# Patient Record
Sex: MALE | NOT HISPANIC OR LATINO | Employment: FULL TIME | RURAL
[De-identification: names, ages, dates, MRNs, and addresses within clinical notes are randomized per-mention and may not be internally consistent; named-entity substitution may affect disease eponyms.]

---

## 2024-04-25 ENCOUNTER — OFFICE VISIT (OUTPATIENT)
Dept: FAMILY MEDICINE CLINIC | Facility: CLINIC | Age: 39
End: 2024-04-25
Payer: COMMERCIAL

## 2024-04-25 VITALS
HEART RATE: 103 BPM | DIASTOLIC BLOOD PRESSURE: 70 MMHG | SYSTOLIC BLOOD PRESSURE: 110 MMHG | TEMPERATURE: 98.6 F | BODY MASS INDEX: 23.44 KG/M2 | OXYGEN SATURATION: 98 % | HEIGHT: 71 IN | WEIGHT: 167.4 LBS

## 2024-04-25 DIAGNOSIS — R19.7 DIARRHEA, UNSPECIFIED TYPE: ICD-10-CM

## 2024-04-25 DIAGNOSIS — R53.83 FATIGUE, UNSPECIFIED TYPE: ICD-10-CM

## 2024-04-25 DIAGNOSIS — R42 DIZZINESS: ICD-10-CM

## 2024-04-25 DIAGNOSIS — R16.1 SPLENOMEGALY: ICD-10-CM

## 2024-04-25 DIAGNOSIS — R63.4 WEIGHT LOSS, NON-INTENTIONAL: ICD-10-CM

## 2024-04-25 DIAGNOSIS — R11.14 BILIOUS VOMITING WITH NAUSEA: Primary | ICD-10-CM

## 2024-04-25 NOTE — LETTER
April 25, 2024     Patient: Bubba Cooper   YOB: 1985   Date of Visit: 4/25/2024       To Whom it May Concern:    Bubba Cooper was seen in my clinic on 4/25/2024. He may return to work in three days (Monday, April 29, 2024).    Please consider this as advance notice that Silvio may require an extended time off from work, depending on the results of testing that we have initiated today. He has had three weeks of nausea, vomiting, and diarrhea which has lead to weight loss and debilitating fatigue and dizziness. He has been seen in the ER twice within this time frame. I have ordered further tests to determine the cause of his symptoms, and it is my professional opinion that he not return to work prior to next Monday.     Please feel free to contact our office if you have any further questions or need for any formal paperwork to be completed.        Sincerely,          SUMAYA Becker

## 2024-04-26 ENCOUNTER — TELEPHONE (OUTPATIENT)
Dept: FAMILY MEDICINE CLINIC | Facility: CLINIC | Age: 39
End: 2024-04-26
Payer: COMMERCIAL

## 2024-04-26 NOTE — TELEPHONE ENCOUNTER
Spoke to patient regarding his lab results from yesterday. Preliminary results were printed from [employer]'s website and will be scanned into patient's chart.     We discussed hydration status. Patient reports he has continued with diarrhea but has had no more vomiting since his appointment yesterday. He has eaten and drank today.     Encouraged to push fluids and rest. Will touch base next week after the rest of his lab values result. Discussed reasons to seek ER care over the weekend versus return to the office on Monday. All questions answered.

## 2024-04-28 PROBLEM — R19.7 DIARRHEA: Status: ACTIVE | Noted: 2024-04-28

## 2024-04-28 PROBLEM — R11.14 BILIOUS VOMITING WITH NAUSEA: Status: ACTIVE | Noted: 2024-04-28

## 2024-04-28 PROBLEM — R53.83 FATIGUE: Status: ACTIVE | Noted: 2024-04-28

## 2024-04-28 PROBLEM — R16.1 SPLENOMEGALY: Status: ACTIVE | Noted: 2024-04-28

## 2024-04-28 PROBLEM — R63.4 WEIGHT LOSS, NON-INTENTIONAL: Status: ACTIVE | Noted: 2024-04-28

## 2024-04-28 PROBLEM — R42 DIZZINESS: Status: ACTIVE | Noted: 2024-04-28

## 2024-04-28 NOTE — ASSESSMENT & PLAN NOTE
Patient has been seen at the ER twice within the past 3 weeks. ER completed a CT scan at one point which showed splenomegaly. Patient does not participate in any contact sports at this time. Tip of spleen is palpable on exam today. Strongly encouraged rest and avoidance of any trauma to the abdomen - explained risks and reasons to seek ER care if abdominal trauma were to occur.     Will check labs.

## 2024-04-28 NOTE — PROGRESS NOTES
"    Office Note     Name: Bubba Cooper    : 1985     MRN: [id number]     Chief Complaint  Diarrhea (Pt has had diarrhea for 3 weeks now. States its when he drinks and when he eats), Insomnia (Pt has had a problem getting asleep due to diarrhea and vomiting every couple hours.), Weight Loss (Pt has lost about 20 pounds due to not being able to keep anything down ), Vomiting (Pt has been throwing up for about 3 weeks now ), and Dizziness (Pt states he tends to get very dizzy after walking for a while )    Subjective     History of Present Illness:  Bubba Cooper is a 38 y.o. male who presents today for vomiting and diarrhea for the past 3 weeks. He has lost 20 pounds. He is extremely fatigued and gets dizzy when walking around. He's been seen in there ER twice within this time frame.       Objective     History reviewed. No pertinent past medical history.  History reviewed. No pertinent surgical history.  History reviewed. No pertinent family history.    Vital Signs  /70 (BP Location: Left arm, Patient Position: Sitting, Cuff Size: Adult)   Pulse 103   Temp 98.6 °F (37 °C) (Oral)   Ht 180.3 cm (71\")   Wt 75.9 kg (167 lb 6.4 oz)   SpO2 98%   BMI 23.35 kg/m²   Estimated body mass index is 23.35 kg/m² as calculated from the following:    Height as of this encounter: 180.3 cm (71\").    Weight as of this encounter: 75.9 kg (167 lb 6.4 oz).    Physical Exam  Vitals reviewed.   Constitutional:       Appearance: Normal appearance.   HENT:      Head: Normocephalic.      Right Ear: Tympanic membrane, ear canal and external ear normal.      Left Ear: Tympanic membrane, ear canal and external ear normal.      Nose: Nose normal.      Mouth/Throat:      Mouth: Mucous membranes are moist.      Pharynx: Posterior oropharyngeal erythema (mild) present.   Cardiovascular:      Rate and Rhythm: Normal rate and regular rhythm.      Heart sounds: Normal heart sounds.   Pulmonary:      Effort: Pulmonary effort is normal. "      Breath sounds: Normal breath sounds.   Abdominal:      General: Abdomen is flat. Bowel sounds are normal.      Palpations: Abdomen is soft. There is splenomegaly (tip of spleen felt on palpation of LUQ).      Tenderness: There is abdominal tenderness in the right upper quadrant, epigastric area and left upper quadrant.   Skin:     General: Skin is warm and dry.      Capillary Refill: Capillary refill takes less than 2 seconds.   Neurological:      General: No focal deficit present.      Mental Status: He is alert and oriented to person, place, and time.   Psychiatric:         Mood and Affect: Mood normal.         Behavior: Behavior normal.                   POCT Results (if applicable):  No results found for this or any previous visit.         Assessment and Plan     Diagnoses and all orders for this visit:    1. Bilious vomiting with nausea (Primary)  Assessment & Plan:  Patient reports he has had nausea, vomiting, and diarrhea for the past 3 weeks. He has been seen at the ER twice within that time. He reports he has been unable to keep anything down; either vomiting or diarrhea every couple of hours.     Will check labs. Discussed and encouraged symptomatic measures. Discussed when to seek ER care versus return to the office for another evaluation.     Orders:  -     CBC & Differential  -     Comprehensive Metabolic Panel  -     Amylase  -     Lipase    2. Diarrhea, unspecified type  Assessment & Plan:  Patient reports he has had nausea, vomiting, and diarrhea for the past 3 weeks. He has been seen at the ER twice within that time. He reports he has been unable to keep anything down; either vomiting or diarrhea every couple of hours.     Will check labs. Discussed and encouraged symptomatic measures. Discussed when to seek ER care versus return to the office for another evaluation.     Orders:  -     CBC & Differential  -     Comprehensive Metabolic Panel  -     Amylase  -     Lipase    3. Weight loss,  non-intentional  Assessment & Plan:  Patient reports he has lost 20 pounds in the last month due to lack of appetite, nausea, vomiting, and diarrhea.       4. Fatigue, unspecified type  Assessment & Plan:  Patient reports extreme fatigue. He has been unable to sleep due to vomiting and/or diarrhea every couple of hours around the clock. Reports easily fatigued if he tries to do much around the house or go to work.     Will check labs. Discussed and encouraged symptomatic measures. Discussed when to seek ER care versus return to the office for another evaluation.     Orders:  -     T4, Free  -     TSH  -     Iron  -     Ferritin  -     Phil-Barr Virus Early Antigen Antibody, IgG  -     EBV PCR QN, WB  -     EBV Antibody Profile    5. Dizziness  Assessment & Plan:  Patient reports dizziness when walking around, fears he may be dehydrated but it unable to keep any fluids down. He was given IV fluids while in there ER each visit, but the last time that happened was over a week ago.     Will check labs. Discussed and encouraged symptomatic measures. Discussed when to seek ER care versus return to the office for another evaluation.     Orders:  -     T4, Free  -     TSH  -     Iron  -     Ferritin    6. Splenomegaly  Assessment & Plan:  Patient has been seen at the ER twice within the past 3 weeks. ER completed a CT scan at one point which showed splenomegaly. Patient does not participate in any contact sports at this time. Tip of spleen is palpable on exam today. Strongly encouraged rest and avoidance of any trauma to the abdomen - explained risks and reasons to seek ER care if abdominal trauma were to occur.     Will check labs.     Orders:  -     Phil-Barr Virus Early Antigen Antibody, IgG  -     EBV PCR QN, WB  -     EBV Antibody Profile      BMI is within normal parameters. No other follow-up for BMI required.      Follow Up  Return if symptoms worsen or fail to improve.    Britta Son, APRN

## 2024-04-28 NOTE — ASSESSMENT & PLAN NOTE
Patient reports extreme fatigue. He has been unable to sleep due to vomiting and/or diarrhea every couple of hours around the clock. Reports easily fatigued if he tries to do much around the house or go to work.     Will check labs. Discussed and encouraged symptomatic measures. Discussed when to seek ER care versus return to the office for another evaluation.

## 2024-04-28 NOTE — ASSESSMENT & PLAN NOTE
Patient reports dizziness when walking around, fears he may be dehydrated but it unable to keep any fluids down. He was given IV fluids while in there ER each visit, but the last time that happened was over a week ago.     Will check labs. Discussed and encouraged symptomatic measures. Discussed when to seek ER care versus return to the office for another evaluation.

## 2024-04-28 NOTE — ASSESSMENT & PLAN NOTE
Patient reports he has had nausea, vomiting, and diarrhea for the past 3 weeks. He has been seen at the ER twice within that time. He reports he has been unable to keep anything down; either vomiting or diarrhea every couple of hours.     Will check labs. Discussed and encouraged symptomatic measures. Discussed when to seek ER care versus return to the office for another evaluation.

## 2024-04-28 NOTE — ASSESSMENT & PLAN NOTE
Patient reports he has lost 20 pounds in the last month due to lack of appetite, nausea, vomiting, and diarrhea.

## 2024-04-29 LAB
ALBUMIN SERPL-MCNC: 4 G/DL (ref 4.1–5.1)
ALBUMIN/GLOB SERPL: 1.6 {RATIO} (ref 1.2–2.2)
ALP SERPL-CCNC: 61 IU/L (ref 44–121)
ALT SERPL-CCNC: 51 IU/L (ref 0–44)
AMYLASE SERPL-CCNC: 57 U/L (ref 31–110)
AST SERPL-CCNC: 35 IU/L (ref 0–40)
BASOPHILS # BLD AUTO: 0 X10E3/UL (ref 0–0.2)
BASOPHILS NFR BLD AUTO: 1 %
BILIRUB SERPL-MCNC: 0.5 MG/DL (ref 0–1.2)
BUN SERPL-MCNC: 8 MG/DL (ref 6–20)
BUN/CREAT SERPL: 9 (ref 9–20)
CALCIUM SERPL-MCNC: 8.5 MG/DL (ref 8.7–10.2)
CHLORIDE SERPL-SCNC: 100 MMOL/L (ref 96–106)
CO2 SERPL-SCNC: 23 MMOL/L (ref 20–29)
CREAT SERPL-MCNC: 0.85 MG/DL (ref 0.76–1.27)
EBV DNA # BLD NAA+PROBE: 357 COPIES/ML
EBV DNA SPEC NAA+PROBE-LOG#: 2.55 LOG10COPY/ML
EBV EA-D IGG SER-ACNC: <9 U/ML (ref 0–8.9)
EBV NA IGG SER IA-ACNC: 118 U/ML (ref 0–17.9)
EBV VCA IGG SER IA-ACNC: 76.5 U/ML (ref 0–17.9)
EBV VCA IGM SER IA-ACNC: <36 U/ML (ref 0–35.9)
EGFRCR SERPLBLD CKD-EPI 2021: 115 ML/MIN/1.73
EOSINOPHIL # BLD AUTO: 0.1 X10E3/UL (ref 0–0.4)
EOSINOPHIL NFR BLD AUTO: 1 %
ERYTHROCYTE [DISTWIDTH] IN BLOOD BY AUTOMATED COUNT: 12.9 % (ref 11.6–15.4)
FERRITIN SERPL-MCNC: 599 NG/ML (ref 30–400)
GLOBULIN SER CALC-MCNC: 2.5 G/DL (ref 1.5–4.5)
GLUCOSE SERPL-MCNC: 64 MG/DL (ref 70–99)
HCT VFR BLD AUTO: 42.6 % (ref 37.5–51)
HGB BLD-MCNC: 14.9 G/DL (ref 13–17.7)
IMM GRANULOCYTES # BLD AUTO: 0 X10E3/UL (ref 0–0.1)
IMM GRANULOCYTES NFR BLD AUTO: 0 %
IRON SERPL-MCNC: 48 UG/DL (ref 38–169)
LIPASE SERPL-CCNC: 34 U/L (ref 13–78)
LYMPHOCYTES # BLD AUTO: 1.6 X10E3/UL (ref 0.7–3.1)
LYMPHOCYTES NFR BLD AUTO: 35 %
MCH RBC QN AUTO: 30.8 PG (ref 26.6–33)
MCHC RBC AUTO-ENTMCNC: 35 G/DL (ref 31.5–35.7)
MCV RBC AUTO: 88 FL (ref 79–97)
MONOCYTES # BLD AUTO: 0.4 X10E3/UL (ref 0.1–0.9)
MONOCYTES NFR BLD AUTO: 9 %
MORPHOLOGY BLD-IMP: NORMAL
NEUTROPHILS # BLD AUTO: 2.5 X10E3/UL (ref 1.4–7)
NEUTROPHILS NFR BLD AUTO: 54 %
PLATELET # BLD AUTO: 260 X10E3/UL (ref 150–450)
POTASSIUM SERPL-SCNC: 3.7 MMOL/L (ref 3.5–5.2)
PROT SERPL-MCNC: 6.5 G/DL (ref 6–8.5)
RBC # BLD AUTO: 4.83 X10E6/UL (ref 4.14–5.8)
SERVICE CMNT-IMP: ABNORMAL
SODIUM SERPL-SCNC: 139 MMOL/L (ref 134–144)
T4 FREE SERPL-MCNC: 1.22 NG/DL (ref 0.82–1.77)
TSH SERPL DL<=0.005 MIU/L-ACNC: 2.27 UIU/ML (ref 0.45–4.5)
WBC # BLD AUTO: 4.6 X10E3/UL (ref 3.4–10.8)

## 2024-04-30 ENCOUNTER — TELEPHONE (OUTPATIENT)
Dept: FAMILY MEDICINE CLINIC | Facility: CLINIC | Age: 39
End: 2024-04-30
Payer: COMMERCIAL

## 2024-04-30 NOTE — TELEPHONE ENCOUNTER
Name: Bubba Cooper      Relationship: Self      Best Callback Number: [phone]       HUB PROVIDED THE RELAY MESSAGE FROM THE OFFICE      PATIENT: VOICED UNDERSTANDING AND HAS NO FURTHER QUESTIONS AT THIS TIME    ADDITIONAL INFORMATION:

## 2024-04-30 NOTE — TELEPHONE ENCOUNTER
GABIM for pt to call back  HUB TO RELAY     ----- Message from Sarah PUTNAM sent at 4/29/2024  9:21 AM EDT -----  Please let Silvio know we got the rest of his lab results back.     He does have mononucleosis caused by Phil-Barr Virus (EBV). This is the cause for his splenomegaly, for sure. This is also very likely the cause for his fatigue and GI symptoms. The typical treatment for EBV is supportive and symptomatic in nature - unfortunately, it can take weeks to fully recover from it. He needs to make sure he is drinking plenty of fluids and resting as much as possible. Continue to avoid any trauma to the abdomen due to risk for splenic rupture.